# Patient Record
Sex: FEMALE | Race: WHITE | NOT HISPANIC OR LATINO | URBAN - METROPOLITAN AREA
[De-identification: names, ages, dates, MRNs, and addresses within clinical notes are randomized per-mention and may not be internally consistent; named-entity substitution may affect disease eponyms.]

---

## 2019-01-24 ENCOUNTER — EMERGENCY (EMERGENCY)
Facility: HOSPITAL | Age: 39
LOS: 1 days | Discharge: HOME | End: 2019-01-24
Attending: EMERGENCY MEDICINE | Admitting: EMERGENCY MEDICINE
Payer: MEDICARE

## 2019-01-24 VITALS
SYSTOLIC BLOOD PRESSURE: 107 MMHG | DIASTOLIC BLOOD PRESSURE: 77 MMHG | HEART RATE: 88 BPM | RESPIRATION RATE: 15 BRPM | OXYGEN SATURATION: 100 % | TEMPERATURE: 99 F

## 2019-01-24 VITALS
OXYGEN SATURATION: 98 % | WEIGHT: 130.07 LBS | RESPIRATION RATE: 14 BRPM | TEMPERATURE: 98 F | HEIGHT: 60 IN | DIASTOLIC BLOOD PRESSURE: 88 MMHG | SYSTOLIC BLOOD PRESSURE: 120 MMHG | HEART RATE: 90 BPM

## 2019-01-24 PROCEDURE — 99284 EMERGENCY DEPT VISIT MOD MDM: CPT | Mod: 25

## 2019-01-24 PROCEDURE — 99284 EMERGENCY DEPT VISIT MOD MDM: CPT

## 2019-01-24 PROCEDURE — 93971 EXTREMITY STUDY: CPT

## 2019-01-24 PROCEDURE — 93971 EXTREMITY STUDY: CPT | Mod: 26,LT

## 2019-01-24 NOTE — ED PROVIDER NOTE - OBJECTIVE STATEMENT
37 yo white female with H/O MS who today was noted by PT to have left leg swelling and this was sent here for evaluation. No pain. No trauma. No fever or chills. No H/O DVT in the past.

## 2019-01-24 NOTE — ED ADULT NURSE NOTE - OBJECTIVE STATEMENT
39 y/o F patient presents to ED from home c/o swelling and redness to left lower ankle and heel. As per patient's mother, patient was seen by PT who recommended she come to ED for rule out cellulitis. Patient has history of MS. lungs CTA. skin warm. mild redness noted to left heel. cool to touch. patient is baseline of bedridden as per patient's mother. abdomen soft, non tender, non distended. Patient denies HA, dizziness, SOB, chest pain, abdominal pain, N/V/D bowel/bladder changes. Safety and comfort measures provided and maintained. Family and aide bedside. MD bedside.

## 2019-01-24 NOTE — ED ADULT NURSE NOTE - NSIMPLEMENTINTERV_GEN_ALL_ED
Implemented All Fall Risk Interventions:  [place] to call system. Call bell, personal items and telephone within reach. Instruct patient to call for assistance. Room bathroom lighting operational. Non-slip footwear when patient is off stretcher. Physically safe environment: no spills, clutter or unnecessary equipment. Stretcher in lowest position, wheels locked, appropriate side rails in place. Provide visual cue, wrist band, yellow gown, etc. Monitor gait and stability. Monitor for mental status changes and reorient to person, place, and time. Review medications for side effects contributing to fall risk. Reinforce activity limits and safety measures with patient and family.

## 2022-08-18 ENCOUNTER — APPOINTMENT (OUTPATIENT)
Dept: ORTHOPEDIC SURGERY | Facility: CLINIC | Age: 42
End: 2022-08-18

## 2022-08-18 DIAGNOSIS — G35 MULTIPLE SCLEROSIS: ICD-10-CM

## 2022-08-18 DIAGNOSIS — M25.611 STIFFNESS OF RIGHT SHOULDER, NOT ELSEWHERE CLASSIFIED: ICD-10-CM

## 2022-08-18 PROCEDURE — 99213 OFFICE O/P EST LOW 20 MIN: CPT | Mod: 25

## 2022-08-18 PROCEDURE — 73140 X-RAY EXAM OF FINGER(S): CPT | Mod: RT

## 2022-08-18 PROCEDURE — 20610 DRAIN/INJ JOINT/BURSA W/O US: CPT | Mod: RT

## 2022-08-18 PROCEDURE — J3490M: CUSTOM

## 2022-08-18 PROCEDURE — 73030 X-RAY EXAM OF SHOULDER: CPT | Mod: RT

## 2022-08-18 NOTE — ASSESSMENT
[FreeTextEntry1] : The patient was advised of the diagnosis. The natural history of the pathology was explained in full to the patient in layman's terms. All questions were answered. The risks and benefits of surgical and non-surgical treatment alternatives were explained in full to the patient.\par \par I offered an injection, after all risks were explained including: allergic reactions and infection, under sterile conditions using 1 mg of Depo-Medrol and 8cc of 1% lidocaine without epinephrine was injected into the right posterior subacromial space. The patient tolerated the procedure well and received significant relief following the injection.  The patient tolerated the procedure well and received significant relief following the injection. All questions were answered, all alternatives discussed and the patient is in complete agreement with that plan. Follow-up appointment as instructed. Any issues and the patient will call or come in sooner.\par \par injection tolerated well. \par Discussed heating to help physical therapy/HEP stretching. Use caution as exceeding her level of comfort can cause injuries. \par Questions answered. \par \par

## 2022-08-18 NOTE — PHYSICAL EXAM
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  [FreeTextEntry1] : No fractures noted.

## 2022-08-18 NOTE — PROCEDURE
[Large Joint Injection] : Large joint injection [Right] : of the right [Subacromial Space] : subacromial space [Pain] : pain [Inflammation] : inflammation [Alcohol] : alcohol [Betadine] : betadine [Ethyl Chloride sprayed topically] : ethyl chloride sprayed topically [Sterile technique used] : sterile technique used [] : Patient tolerated procedure well [Call if redness, pain or fever occur] : call if redness, pain or fever occur [Apply ice for 15min out of every hour for the next 12-24 hours as tolerated] : apply ice for 15 minutes out of every hour for the next 12-24 hours as tolerated [Patient was advised to rest the joint(s) for ____ days] : patient was advised to rest the joint(s) for [unfilled] days [Risks, benefits, alternatives discussed / Verbal consent obtained] : the risks benefits, and alternatives have been discussed, and verbal consent was obtained

## 2022-08-18 NOTE — HISTORY OF PRESENT ILLNESS
[Right Arm] : right arm [Gradual] : gradual [Sudden] : sudden [6] : 6 [5] : 5 [Burning] : burning [Shooting] : shooting [Stabbing] : stabbing [Intermittent] : intermittent [Exercising] : exercising [Retired] : Work status: retired [de-identified] : 8/18/22 Return visit for this 42 year female right shoulder pain, hx of MS, wheel chair bound, Here in office with her aid and son. \par \par  9/17/20 return visit for this 39 yo female, hx of severe MS, wheelchair bound, c/o spon. onset of\par lt shoulder pain x last 2-03 months duration. has very little use of her rt shoulder at present time. Taking advil prn w/o\par relief.Recieves home PT 2x/week for spasticity. [] : Post Surgical Visit: no [FreeTextEntry1] : right shoulder and right pinky  [FreeTextEntry5] : Pt has had a recent inflamation of her right shoulder and pt is unable to extend her pipnky all the way  [de-identified] : None

## 2022-08-18 NOTE — DISCUSSION/SUMMARY
[de-identified] : "Written by Nas Javed, acting as Scribe for Laz Foster M.D"\par Dr. Foster -\par The documentation recorded by the scribe accurately reflects the service I personally performed and the decisions made by me.\par \par

## 2022-08-19 ENCOUNTER — TRANSCRIPTION ENCOUNTER (OUTPATIENT)
Age: 42
End: 2022-08-19

## 2023-07-26 ENCOUNTER — TRANSCRIPTION ENCOUNTER (OUTPATIENT)
Age: 43
End: 2023-07-26